# Patient Record
(demographics unavailable — no encounter records)

---

## 2025-07-17 NOTE — ASSESSMENT
[Carbohydrate Consistent Diet] : carbohydrate consistent diet [Importance of Diet and Exercise] : importance of diet and exercise to improve glycemic control, achieve weight loss and improve cardiovascular health [Weight Loss] : weight loss done

## 2025-07-17 NOTE — REASON FOR VISIT
[Follow - Up] : a follow-up visit [Pituitary Evaluation/ Disorder] : pituitary evaluation/disorder [Weight Management/Obesity] : weight management/obesity [Other___] : [unfilled]

## 2025-07-20 NOTE — ADDENDUM
[FreeTextEntry1] :  I, Daysi Card, act solely as a scribe for Dr. Clyde Montague on this date. 07/17/2025

## 2025-07-20 NOTE — PHYSICAL EXAM
[Obese] : obese [No Acute Distress] : no acute distress [Normal Sclera/Conjunctiva] : normal sclera/conjunctiva [Visual Fields Grossly Intact] : visual fields grossly intact [Normal Outer Ear/Nose] : the ears and nose were normal in appearance [Thyroid Not Enlarged] : the thyroid was not enlarged [No Thyroid Nodules] : no palpable thyroid nodules [Clear to Auscultation] : lungs were clear to auscultation bilaterally [Normal S1, S2] : normal S1 and S2 [Normal Rate] : heart rate was normal [Pedal Pulses Normal] : the pedal pulses are present [Normal Appearance] : normal in appearance [Soft] : abdomen soft [No Stigmata of Cushings Syndrome] : no stigmata of Cushings Syndrome [Normal Gait] : normal gait [Acanthosis Nigricans] : acanthosis nigricans present [Normal Reflexes] : deep tendon reflexes were 2+ and symmetric [No Tremors] : no tremors [Oriented x3] : oriented to person, place, and time [Abdominal Striae] : no abdominal striae [Acne] : no acne [de-identified] : Bilateral PT pulses normal. [de-identified] : Trace nipple discharge when expression bilaterally, appears physiologic. Right breast nodularity noted at 4 o'clock 10cm from nipple (pt reports biopsy here yrs ago)

## 2025-07-20 NOTE — PHYSICAL EXAM
[Obese] : obese [No Acute Distress] : no acute distress [Normal Sclera/Conjunctiva] : normal sclera/conjunctiva [Visual Fields Grossly Intact] : visual fields grossly intact [Normal Outer Ear/Nose] : the ears and nose were normal in appearance [Thyroid Not Enlarged] : the thyroid was not enlarged [No Thyroid Nodules] : no palpable thyroid nodules [Clear to Auscultation] : lungs were clear to auscultation bilaterally [Normal S1, S2] : normal S1 and S2 [Normal Rate] : heart rate was normal [Pedal Pulses Normal] : the pedal pulses are present [Normal Appearance] : normal in appearance [Soft] : abdomen soft [No Stigmata of Cushings Syndrome] : no stigmata of Cushings Syndrome [Normal Gait] : normal gait [Acanthosis Nigricans] : acanthosis nigricans present [Normal Reflexes] : deep tendon reflexes were 2+ and symmetric [No Tremors] : no tremors [Oriented x3] : oriented to person, place, and time [Abdominal Striae] : no abdominal striae [Acne] : no acne [de-identified] : Bilateral PT pulses normal. [de-identified] : Trace nipple discharge when expression bilaterally, appears physiologic. Right breast nodularity noted at 4 o'clock 10cm from nipple (pt reports biopsy here yrs ago)

## 2025-07-20 NOTE — HISTORY OF PRESENT ILLNESS
[FreeTextEntry1] : 54 year old F pt, with Hx of Hyperprolactinemia (dx. in 2019), presents today to establish endocrine care.  Reports she was previously seeing Dr. Dima Gonzales (endocrinologist) but he stopped seeing non-diabetic cases. PSHx: 1988 Polycystic ovary removal surgery, Gastric Sleeve surgery (16) FHx: Mother: Breast CA, had total thyroidectomy for goiter. Father: hypertension; Siblings: Unknown.  No FHx of: heart disease, thyroid cancer SHx: No alcohol, cigarette, drug use. Lives with son in [place]. 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 Nino Mccullough phone # [phone], fax # [phone]  2024   Pt is here for elevated prolactin.  Pt has /92 and BMI 51.76.  CC: "I'm here for my prolactin and also my weight" Prolactin 52.4 (2023) Reports she was diagnosed with elevated prolactin in .  Had MRI sella in  which showed tumor "between her eyes" - presumably prolactinoma (does not have report). Denies regular headaches, peripheral vision changes, tunnel vision.  Denies psychiatric conditions, use of antipsychotic medications. Endorses significant personal stress currently, as her father is very sick and lives in .  Endorses hx of PCOS. Had very irregular periods while menstruating. .  Denies current breast discharge, pain. Reports 10 years ago she had breast discharge that was alternately clear and brown. This has since resolved.  Had mammogram and subsequent biopsy on right breast in  (negative biopsy).  LMP: 2-3 yrs ago  Obesity: Reports she has struggled with weight all her life.  Lost 80lbs after gastric surgery in 2016, but when  passed away she gained it back due to stress.  She and son state she does not eat very much but still cannot lose weight. Reports active lifestyle, walking >1hr daily. 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 Endorses easy bruising.  Complications: Reports hx of Osteoarthritis, MARY, pre-diabetes, HTN.  Review of Labs:  2023 Prolactin: 52.4, FSH: 12.9, LH: 7.5, TSH: 0.792, FT4: 1.6, A1c: 5.7%, LDL: 103, Total cholesterol: 172, Progesterone: 0.1 ng/mL Estrogens (total): 118 pg/mL  2024  Pt has POCT 101, /89 and BMI 51.94. No significant weight change.  CC: "I'm feeling well. Pt reports that her galactorrhea has subsided. She is increasing her daily physical activities and following up regularly with her ophthalmologist.  Pt brought in pituitary MRIs: 10/18/2018: Small 0.5 cm focus of decreased enhancement in the left pituitary is consistent with a pituitary microadenoma. No mass effect on the optic chiasm.  10/25/2021: Stable 5 mm hypoenhancing microadenoma within the left posterolateral aspect of the adenohypophysis of the pituitary gland.    2024  Pt has /74 and BMI 50.85. Pt has lost 6 lbs in 3 months.  CC: "I feel good and I'm very happy" Pt reports that she feels great about her weight loss. Pt reports that her energy levels have drastically improved and she has improved mobility. Her appetite is reduced. She does not always take Amlodipine or Furosemide every day.  Pt denies headaches and galactorrhea (resolved for the past few months),    2024  Pt has /67 and BMI 47.74. Pt has lost 4 lbs in 2 months.  CC: "I'm feeling well." Pt denies physical complaints and is pleased that she is losing weight. She denies GI side effects, galactorrhea, and hx of bone fractures. Pt continues to endorse headaches. 25, Patient presents today for her endocrine visit Patient is very pleased with her results physically she feels great denies headaches Reviewing her labs from January and A1c of 5% prolactin 16.6  2025 Pt has /78 and BMI 44.8. No significant weight change. CC: "I am doing good".  Recent labs (2025) reveal prolactin 14.2, LDL-c 117, A1c 5%, ACR normal. Pt states that 9 years ago, she had bariatric surgery at [facility]. She states that she is comfortable with her current weight.   [Medications verified as per pt on 2025] Current Medications: Zepbound 7.5 mg qw (rx 24), Symbicort 160/4.5mg 2 doses qd, Albuterol sulfate 2 puffs PRN q6hrs, Amlodipine 10 mg qd, Furosemide 20mg daily, Cabergoline 0.5 mg half tablet weekly  Medication modified/added this visit: Zepbound 10 mg qw

## 2025-07-20 NOTE — END OF VISIT
[FreeTextEntry3] :  All medical record entries made by the Scribe were at my, Dr. Clyde Montague, direction and personally dictated by me on 07/17/2025. I have reviewed the chart and agree that the record accurately reflects my personal performance of the history, physical exam, and assessment and plan. I have also personally directed, reviewed, and agreed with the chart.  [Time Spent: ___ minutes] : I have spent [unfilled] minutes of time on the encounter which excludes teaching and separately reported services.

## 2025-07-20 NOTE — HISTORY OF PRESENT ILLNESS
[FreeTextEntry1] : 54 year old F pt, with Hx of Hyperprolactinemia (dx. in 2019), presents today to establish endocrine care.  Reports she was previously seeing Dr. Dima Gonzales (endocrinologist) but he stopped seeing non-diabetic cases. PSHx: 1988 Polycystic ovary removal surgery, Gastric Sleeve surgery (16) FHx: Mother: Breast CA, had total thyroidectomy for goiter. Father: hypertension; Siblings: Unknown.  No FHx of: heart disease, thyroid cancer SHx: No alcohol, cigarette, drug use. 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 Nino Mccullough phone # [phone], fax # [phone]  2024   Pt is here for elevated prolactin.  Pt has /92 and BMI 51.76.  CC: "I'm here for my prolactin and also my weight" Prolactin 52.4 (2023) Reports she was diagnosed with elevated prolactin in .  Had MRI sella in  which showed tumor "between her eyes" - presumably prolactinoma (does not have report). Denies regular headaches, peripheral vision changes, tunnel vision.  Denies psychiatric conditions, use of antipsychotic medications. Endorses significant personal stress currently, as her father is very sick and lives in .  Endorses hx of PCOS. Had very irregular periods while menstruating. .  Denies current breast discharge, pain. Reports 10 years ago she had breast discharge that was alternately clear and brown. This has since resolved.  Had mammogram and subsequent biopsy on right breast in  (negative biopsy).  LMP: 2-3 yrs ago  Obesity: Reports she has struggled with weight all her life.  Lost 80lbs after gastric surgery in 2016, but when  passed away she gained it back due to stress.  She and son state she does not eat very much but still cannot lose weight. Reports active lifestyle, walking >1hr daily. 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 Endorses easy bruising.  Complications: Reports hx of Osteoarthritis, MARY, pre-diabetes, HTN.  Review of Labs:  2023 Prolactin: 52.4, FSH: 12.9, LH: 7.5, TSH: 0.792, FT4: 1.6, A1c: 5.7%, LDL: 103, Total cholesterol: 172, Progesterone: 0.1 ng/mL Estrogens (total): 118 pg/mL  2024  Pt has POCT 101, /89 and BMI 51.94. No significant weight change.  CC: "I'm feeling well. Pt reports that her galactorrhea has subsided. She is increasing her daily physical activities and following up regularly with her ophthalmologist.  Pt brought in pituitary MRIs: 10/18/2018: Small 0.5 cm focus of decreased enhancement in the left pituitary is consistent with a pituitary microadenoma. No mass effect on the optic chiasm.  10/25/2021: Stable 5 mm hypoenhancing microadenoma within the left posterolateral aspect of the adenohypophysis of the pituitary gland.    2024  Pt has /74 and BMI 50.85. Pt has lost 6 lbs in 3 months.  CC: "I feel good and I'm very happy" Pt reports that she feels great about her weight loss. Pt reports that her energy levels have drastically improved and she has improved mobility. Her appetite is reduced. She does not always take Amlodipine or Furosemide every day.  Pt denies headaches and galactorrhea (resolved for the past few months),    2024  Pt has /67 and BMI 47.74. Pt has lost 4 lbs in 2 months.  CC: "I'm feeling well." Pt denies physical complaints and is pleased that she is losing weight. She denies GI side effects, galactorrhea, and hx of bone fractures. Pt continues to endorse headaches. 25, Patient presents today for her endocrine visit Patient is very pleased with her results physically she feels great denies headaches Reviewing her labs from January and A1c of 5% prolactin 16.6  2025 Pt has /78 and BMI 44.8. No significant weight change. CC: "I am doing good".  Recent labs (2025) reveal prolactin 14.2, LDL-c 117, A1c 5%, ACR normal. Pt states that 9 years ago, she had bariatric surgery at [facility]. She states that she is comfortable with her current weight.   [Medications verified as per pt on 2025] Current Medications: Zepbound 7.5 mg qw (rx 24), Symbicort 160/4.5mg 2 doses qd, Albuterol sulfate 2 puffs PRN q6hrs, Amlodipine 10 mg qd, Furosemide 20mg daily, Cabergoline 0.5 mg half tablet weekly  Medication modified/added this visit: Zepbound 10 mg qw